# Patient Record
Sex: MALE | Race: WHITE | NOT HISPANIC OR LATINO | URBAN - METROPOLITAN AREA
[De-identification: names, ages, dates, MRNs, and addresses within clinical notes are randomized per-mention and may not be internally consistent; named-entity substitution may affect disease eponyms.]

---

## 2019-10-11 ENCOUNTER — EMERGENCY (EMERGENCY)
Facility: HOSPITAL | Age: 19
LOS: 1 days | Discharge: HOME | End: 2019-10-11
Attending: EMERGENCY MEDICINE
Payer: COMMERCIAL

## 2019-10-11 VITALS
OXYGEN SATURATION: 100 % | SYSTOLIC BLOOD PRESSURE: 127 MMHG | RESPIRATION RATE: 18 BRPM | HEART RATE: 58 BPM | DIASTOLIC BLOOD PRESSURE: 61 MMHG | TEMPERATURE: 98 F

## 2019-10-11 VITALS
TEMPERATURE: 98 F | SYSTOLIC BLOOD PRESSURE: 143 MMHG | HEIGHT: 70 IN | DIASTOLIC BLOOD PRESSURE: 76 MMHG | RESPIRATION RATE: 18 BRPM | OXYGEN SATURATION: 100 % | HEART RATE: 60 BPM | WEIGHT: 160.06 LBS

## 2019-10-11 LAB
APPEARANCE UR: CLEAR — SIGNIFICANT CHANGE UP
BILIRUB UR-MCNC: NEGATIVE — SIGNIFICANT CHANGE UP
COLOR SPEC: COLORLESS — SIGNIFICANT CHANGE UP
DIFF PNL FLD: NEGATIVE — SIGNIFICANT CHANGE UP
GLUCOSE UR QL: NEGATIVE — SIGNIFICANT CHANGE UP
KETONES UR-MCNC: NEGATIVE — SIGNIFICANT CHANGE UP
LEUKOCYTE ESTERASE UR-ACNC: NEGATIVE — SIGNIFICANT CHANGE UP
NITRITE UR-MCNC: NEGATIVE — SIGNIFICANT CHANGE UP
PH UR: 6.5 — SIGNIFICANT CHANGE UP (ref 5–8)
PROT UR-MCNC: NEGATIVE — SIGNIFICANT CHANGE UP
SP GR SPEC: 1 — LOW (ref 1.01–1.02)
UROBILINOGEN FLD QL: NEGATIVE — SIGNIFICANT CHANGE UP

## 2019-10-11 PROCEDURE — 81003 URINALYSIS AUTO W/O SCOPE: CPT

## 2019-10-11 PROCEDURE — 76870 US EXAM SCROTUM: CPT

## 2019-10-11 PROCEDURE — 99284 EMERGENCY DEPT VISIT MOD MDM: CPT | Mod: 25

## 2019-10-11 PROCEDURE — 93975 VASCULAR STUDY: CPT | Mod: 26

## 2019-10-11 PROCEDURE — 87491 CHLMYD TRACH DNA AMP PROBE: CPT

## 2019-10-11 PROCEDURE — 99284 EMERGENCY DEPT VISIT MOD MDM: CPT

## 2019-10-11 PROCEDURE — 76870 US EXAM SCROTUM: CPT | Mod: 26

## 2019-10-11 PROCEDURE — 93975 VASCULAR STUDY: CPT

## 2019-10-11 NOTE — ED PROVIDER NOTE - OBJECTIVE STATEMENT
19 year old male no PMH p/w testicular pain. Patient states he developed left testicular pain 3 wk ago while in [facility]. 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 Sexually active w/ new partner 1.5 wk prior to onset, used protection. Patient was treated w/ 5d Z-pack while on ship w/o improvement. States the pain is in the posterior testicle w/ enlarged veins. Denies fever, chills, abdominal pain, nausea, vomiting, penile discharge, pain w/ urination or ejaculation, or dysuria. No hx STDs. 19 year old male no PMH p/w testicular pain. Patient states he developed left testicular pain 3 wk ago while in [facility]. 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 Vaginal intercourse w/ new female partner 1.5 wk prior to onset, +condom use. Patient was treated w/ 5d Z-pack while on ship w/o improvement. States the pain is in the posterior testicle w/ enlarged veins. Denies fever, chills, abdominal pain, nausea, vomiting, penile discharge, pain w/ urination or ejaculation, or dysuria. No hx STDs.

## 2019-10-11 NOTE — ED ADULT NURSE NOTE - OBJECTIVE STATEMENT
Pt is an ambulatory 19 yr old male a/o X 3 c/o left testicular pain that started about 3 weeks ago.  Pt traveled to [place] [facility] recently and was sexually active with protection.  Pt started Azithromycin while on the ship.  Denies fevers, chills, penile discharge or swelling to groin.  Left testicle is swollen, red, tender.  No warmth.

## 2019-10-11 NOTE — ED PROVIDER NOTE - NS ED ROS FT
GENERAL: no fever, no chills  EYES: no change in vision  HEENT: no trouble swallowing or speaking  CARDIAC: no chest pain, no palpitations   PULMONARY: no cough, no shortness of breath, no wheezing  GI: no abdominal pain, no nausea, no vomiting, no diarrhea, no constipation  : no changes in urination, no dysuria, no frequency, no discharge, +LEFT TESTICULAR PAIN, no pain w/ ejaculation  SKIN: no rashes  NEURO: no headache, no numbness, no weakness  MSK: no joint pain, no muscle pain, no back pain    -Reynold Fung, PGY-1

## 2019-10-11 NOTE — ED PROVIDER NOTE - NSFOLLOWUPINSTRUCTIONS_ED_ALL_ED_FT
1. Return to ED for worsening, progressive or any other concerning symptoms   2. Follow up with your primary care doctor in 2-3days  3. follow up with urology

## 2019-10-11 NOTE — ED PROVIDER NOTE - NSFOLLOWUPCLINICS_GEN_ALL_ED_FT
[facility] - Urology  Urology  [address], [address] & [address]th [place] [address]  Phone: [phone]  Fax:   Follow Up Time:

## 2019-10-11 NOTE — ED PROVIDER NOTE - PATIENT PORTAL LINK FT
You can access the FollowMyHealth Patient Portal offered by [facility] by registering at the following website: [email or web address]. By joining [facility]’s FollowMyHealth portal, you will also be able to view your health information using other applications (apps) compatible with our system.

## 2019-10-11 NOTE — ED PROVIDER NOTE - CLINICAL SUMMARY MEDICAL DECISION MAKING FREE TEXT BOX
19 year old male no PMH p/w 3 week history of left testicular pain and enlarged veins. Had protected sexual intercourse w/ new partner 1.5 wk prior. Tx w/ Azithromycin w/o improvement. Plan for G/C, +/- US. Will d/c home w/ abx and PCP follow up. 19 year old male no PMH p/w 3 week history of left testicular pain and enlarged veins. Had protected sexual intercourse w/ new partner 1.5 wk prior. Tx w/ Azithromycin w/o improvement. Plan for G/C, UA, US. Likely d/c home w/ abx and PCP follow up.

## 2019-10-11 NOTE — ED PROVIDER NOTE - ATTENDING CONTRIBUTION TO CARE
I performed a history and physical exam of the patient and discussed their management with the resident. I reviewed the resident's note and agree with the documented findings and plan of care.  Enedina Coppola MD

## 2019-10-11 NOTE — ED PROVIDER NOTE - PHYSICAL EXAMINATION
GENERAL: A&Ox3, non-toxic appearing, no acute distress  HEENT: NCAT, EOMI, oral mucosa moist, normal conjunctiva  RESP: CTAB, no respiratory distress, no wheezes/rhonchi/rales, speaking in full sentences  CV: RRR, no murmurs/rubs/gallops  ABDOMEN: soft, non-tender, non-distended, no guarding  GENITAL: *pending*  MSK: no visible deformities  NEURO: no focal sensory or motor deficits, PERES  SKIN: warm, normal color, well perfused, no rash  PSYCH: normal affect    -Reynold Fung, PGY-1 GENERAL: A&Ox3, non-toxic appearing, no acute distress  HEENT: NCAT, EOMI, oral mucosa moist, normal conjunctiva  RESP: CTAB, no respiratory distress, no wheezes/rhonchi/rales, speaking in full sentences  CV: RRR, no murmurs/rubs/gallops  ABDOMEN: soft, non-tender, non-distended, no guarding  GENITAL (Paulina Coppola): +cremasteric reflex, left epididymus TTP w/ mild posterior swelling no erythema, right testicle smooth w/ mild TTP and no swelling   MSK: no visible deformities  NEURO: no focal sensory or motor deficits, PERES  SKIN: warm, normal color, well perfused, no rash  PSYCH: normal affect    -Reynold Fung, PGY-1

## 2019-10-12 LAB
C TRACH RRNA SPEC QL NAA+PROBE: SIGNIFICANT CHANGE UP
N GONORRHOEA RRNA SPEC QL NAA+PROBE: SIGNIFICANT CHANGE UP

## 2020-11-12 ENCOUNTER — APPOINTMENT (OUTPATIENT)
Dept: ULTRASOUND IMAGING | Facility: CLINIC | Age: 20
End: 2020-11-12
Payer: COMMERCIAL

## 2020-11-12 ENCOUNTER — RESULT REVIEW (OUTPATIENT)
Age: 20
End: 2020-11-12

## 2020-11-12 ENCOUNTER — OUTPATIENT (OUTPATIENT)
Dept: OUTPATIENT SERVICES | Facility: HOSPITAL | Age: 20
LOS: 1 days | End: 2020-11-12
Payer: COMMERCIAL

## 2020-11-12 DIAGNOSIS — Z00.8 ENCOUNTER FOR OTHER GENERAL EXAMINATION: ICD-10-CM

## 2020-11-12 PROBLEM — Z78.9 OTHER SPECIFIED HEALTH STATUS: Chronic | Status: ACTIVE | Noted: 2019-10-11

## 2020-11-12 PROBLEM — Z00.00 ENCOUNTER FOR PREVENTIVE HEALTH EXAMINATION: Status: ACTIVE | Noted: 2020-11-12

## 2020-11-12 PROCEDURE — 76870 US EXAM SCROTUM: CPT | Mod: 26

## 2020-11-12 PROCEDURE — 76870 US EXAM SCROTUM: CPT

## 2021-01-19 NOTE — ED PROVIDER NOTE - CHIEF COMPLAINT
The patient is a 19y Male complaining of testicular pain. The patient is a 20y Male complaining of wrist pain/injury.

## 2021-05-17 ENCOUNTER — NON-APPOINTMENT (OUTPATIENT)
Age: 21
End: 2021-05-17

## 2021-05-18 ENCOUNTER — NON-APPOINTMENT (OUTPATIENT)
Age: 21
End: 2021-05-18

## 2021-05-18 ENCOUNTER — APPOINTMENT (OUTPATIENT)
Dept: CARDIOLOGY | Facility: CLINIC | Age: 21
End: 2021-05-18
Payer: COMMERCIAL

## 2021-05-18 VITALS
DIASTOLIC BLOOD PRESSURE: 72 MMHG | RESPIRATION RATE: 18 BRPM | TEMPERATURE: 98.7 F | WEIGHT: 179 LBS | SYSTOLIC BLOOD PRESSURE: 134 MMHG | OXYGEN SATURATION: 98 % | HEIGHT: 71 IN | BODY MASS INDEX: 25.06 KG/M2 | HEART RATE: 58 BPM

## 2021-05-18 DIAGNOSIS — N50.3 CYST OF EPIDIDYMIS: ICD-10-CM

## 2021-05-18 DIAGNOSIS — R94.31 ABNORMAL ELECTROCARDIOGRAM [ECG] [EKG]: ICD-10-CM

## 2021-05-18 DIAGNOSIS — Z02.1 ENCOUNTER FOR PRE-EMPLOYMENT EXAMINATION: ICD-10-CM

## 2021-05-18 DIAGNOSIS — Z86.79 PERSONAL HISTORY OF OTHER DISEASES OF THE CIRCULATORY SYSTEM: ICD-10-CM

## 2021-05-18 PROCEDURE — 93000 ELECTROCARDIOGRAM COMPLETE: CPT

## 2021-05-18 PROCEDURE — 99204 OFFICE O/P NEW MOD 45 MIN: CPT

## 2021-05-18 PROCEDURE — 99072 ADDL SUPL MATRL&STAF TM PHE: CPT

## 2021-05-18 RX ORDER — SODIUM FLUORIDE 6 MG/ML
1.1 PASTE, DENTIFRICE DENTAL
Qty: 100 | Refills: 0 | Status: ACTIVE | COMMUNITY
Start: 2021-01-05

## 2021-05-18 NOTE — DISCUSSION/SUMMARY
[FreeTextEntry1] : The patient was examined.  His blood pressure was 134/72, and his pulse was 58.  His lungs were clear to auscultation.  Cardiac exam was negative for murmurs rubs or gallops.  The remainder of his physical exam was unremarkable.  His EKG showed normal sinus rhythm, an RSR prime in lead V1, and early repolarization with nonspecific ST and T wave changes.  All of these changes are probably within normal limits for age.  We will send the patient for an echocardiogram to rule out any structural heart disease and for a treadmill stress test.  Total time spent on the day of the encounter was 50 minutes which includes  face-to-face and non face-to-face times personally spent by the physician preparing to see the patient, obtaining  separately obtained history, performing a medically appropriate exam and evaluation, counseling, educating, talking to the family or caregivers, ordering medicines, ordering tests or procedures, referring and communicating with other healthcare foods professionals, and documenting clinical information in the electronic health record.  I believe that the patient will be proven to be healthy after these tests are done.

## 2021-05-18 NOTE — REASON FOR VISIT
[FreeTextEntry1] : This is the first office visit for this 21-year-old white male who was noted to have an abnormal EKG on his preemployment physical.  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  He has on average 6-8 alcoholic beverages per week.  He has 1-2 caffeinated beverages a day.\par \par Both parents and sister are alive and well.\par \par He takes no medications.\par \par He did not denies any allergies to medications.\par \par On review of systems he denies any chest pains, shortness of breath, or palpitations.

## 2021-06-01 ENCOUNTER — APPOINTMENT (OUTPATIENT)
Dept: CARDIOLOGY | Facility: CLINIC | Age: 21
End: 2021-06-01
Payer: COMMERCIAL

## 2021-06-01 PROCEDURE — 99072 ADDL SUPL MATRL&STAF TM PHE: CPT

## 2021-06-01 PROCEDURE — 93306 TTE W/DOPPLER COMPLETE: CPT

## 2021-06-01 PROCEDURE — 93015 CV STRESS TEST SUPVJ I&R: CPT
